# Patient Record
Sex: MALE | Race: WHITE | URBAN - METROPOLITAN AREA
[De-identification: names, ages, dates, MRNs, and addresses within clinical notes are randomized per-mention and may not be internally consistent; named-entity substitution may affect disease eponyms.]

---

## 2020-02-10 ENCOUNTER — OFFICE VISIT (OUTPATIENT)
Dept: FAMILY MEDICINE CLINIC | Age: 30
End: 2020-02-10

## 2020-02-10 VITALS
WEIGHT: 149 LBS | OXYGEN SATURATION: 97 % | SYSTOLIC BLOOD PRESSURE: 108 MMHG | HEART RATE: 74 BPM | TEMPERATURE: 97.7 F | RESPIRATION RATE: 16 BRPM | HEIGHT: 70 IN | BODY MASS INDEX: 21.33 KG/M2 | DIASTOLIC BLOOD PRESSURE: 64 MMHG

## 2020-02-10 DIAGNOSIS — F41.9 ANXIETY AND DEPRESSION: Primary | ICD-10-CM

## 2020-02-10 DIAGNOSIS — N46.9 INFERTILITY MALE: ICD-10-CM

## 2020-02-10 DIAGNOSIS — N52.9 ERECTILE DYSFUNCTION, UNSPECIFIED ERECTILE DYSFUNCTION TYPE: ICD-10-CM

## 2020-02-10 DIAGNOSIS — F32.A ANXIETY AND DEPRESSION: Primary | ICD-10-CM

## 2020-02-10 DIAGNOSIS — Z72.0 TOBACCO ABUSE: ICD-10-CM

## 2020-02-10 RX ORDER — ESCITALOPRAM OXALATE 10 MG/1
TABLET ORAL
Qty: 30 TAB | Refills: 1 | Status: SHIPPED | OUTPATIENT
Start: 2020-02-10

## 2020-02-10 RX ORDER — BUPROPION HYDROCHLORIDE 150 MG/1
150 TABLET, EXTENDED RELEASE ORAL DAILY
Qty: 30 TAB | Refills: 1 | Status: SHIPPED | OUTPATIENT
Start: 2020-02-10 | End: 2022-10-20 | Stop reason: ALTCHOICE

## 2020-02-10 NOTE — PROGRESS NOTES
Chief Complaint   Patient presents with   [address] patient   Currently does not have insurance   Had [facility] but lapsed  Moved to [place] [id number] from [place]  Discuss Referral to Behavior for Depression and Anxiety  Stopped THC 1 week ago  Also discuss sperm count

## 2020-02-10 NOTE — LETTER
NOTIFICATION RETURN TO WORK / SCHOOL 
 
2/10/2020 3:54 PM 
 
Mr. Irvin Dunaway [address] [facility] [id number] To Whom It May Concern: 
 
Irvin Dunaway is currently under the care of Marimar Spencer. He was seen by me in the office today. If there are questions or concerns please have the patient contact our office.  
 
 
 
Sincerely, 
 
 
Wu Beaulieu MD

## 2020-02-10 NOTE — PROGRESS NOTES
Subjective:     Chief Complaint   Patient presents with   [address] [facility] patient        He  is a 34 y.o. male who presents for evaluation of:  New pt to [facility] care. He does have insurance but is currently having trouble with this. He has been trying to establish care with a physician for almost 3 months now and does not want to reschedule. Anxiety and Depression -seems to be a longstanding issue. Not currently on meds and never had a clear dx in past.    Prev smoked THC multiple times per day to self medicate. From [facility]. Does think he may have been diagnosed as a child with Schizophrenia but has not not had psychiatric care or medications for many years. Denies AH/VH. Mom is bipolar. No hx of physical, sexual, or emotional abuse. Endorses decreased appetite, decr energy, trouble with attn and concentration. No SI/HI    A major concern for him is that he gets panic attacks frequently. Endorses symptoms of palpitations, feeling shaky, and chest tightness during these episodes. Sleep varies. States he gets between 0-7 hours per night. Mostly about 4-5 hours per night.     Bipolar screenin) Inflated self-esteem or grandiosity - N  2) Decreased need for sleep (eg, feels rested after only three hours of sleep) - N  3) More talkative than usual or pressure to keep talking - N  4) Flight of ideas or subjective experience that thoughts are racing - Y  5) Distractibility (ie, attention too easily drawn to unimportant or irrelevant external stimuli), as reported or observed - N  6) Increase in goal-directed activity (either socially, at work or school, or sexually) or psychomotor agitation (ie, purposeless non-goal-directed activity) - N  7) Excessive involvement in activities that have a high potential for painful consequences (eg, engaging in unrestrained buying sprees, sexual indiscretions, or foolish business investments) - Y marvin sexually prior to his current relationship    Has been with current girlfriend for 3 years. She and the patient are concerned with his erectile dysfunction and infertility. Attempting to have sex about 4-5 days per week and only able to ejaculate about 2/4 x of these times. They are interested in having children and his girlfriend has had kids in the past but he has never had kids. Smoking 1 ppd for 14 years. Smoking THC about 1x/week. Was smoking multiple x per day about 4 months ago. ROS  Gen - no fever/chills  Resp - no dyspnea or cough  CV - no chest pain or GALVEZ  Rest per HPI    History reviewed. No pertinent past medical history. History reviewed. No pertinent surgical history. No current outpatient medications on file prior to visit. No current facility-administered medications on file prior to visit. Objective:     Vitals:    02/10/20 1436   BP: 108/64   Pulse: 74   Resp: 16   Temp: 97.7 °F (36.5 °C)   TempSrc: Oral   SpO2: 97%   Weight: 149 lb (67.6 kg)   Height: 5' 10\" (1.778 m)     Physical Examination:  General appearance - alert, well appearing, and in no distress  Eyes -sclera anicteric  Neck - supple, no significant adenopathy, no thyromegaly  Chest - clear to auscultation, no wheezes, rales or rhonchi, symmetric air entry  Heart - normal rate, regular rhythm, normal S1, S2, no murmurs, rubs, clicks or gallops  Neurological - alert, oriented, no focal findings or movement disorder noted  Extremities-no edema  Psych- anxious    Assessment/ Plan:   Diagnoses and all orders for this visit:    1. Anxiety and depression-may have more significant underlying psychiatric pathology but will treat for anxiety and depression at this point with Lexapro and Wellbutrin. Encourage patient to seek out psychiatric services to [facility] mental health. If his insurance changes, would consider placing referral to [facility] psychiatry  -     escitalopram oxalate (LEXAPRO) 10 mg tablet;  Take 1/2 tab by mouth daily x 1 week and then increase to 1 tab daily  -     buPROPion SR (WELLBUTRIN SR) 150 mg SR tablet; Take 1 Tab by mouth daily. 2. Erectile dysfunction, unspecified erectile dysfunction type-had a long discussion about organic and inorganic causes of ED. At his age, unlikely to be an underlying medical cause but encouraged him to quit smoking while he is getting his anxiety and depression addressed. Would not be appropriate to    3. Infertility male- may be appropriate to work-up with semen analysis but will wait until insurance starts    4. Tobacco abuse-encouraged cessation and will use Wellbutrin  -     buPROPion SR (WELLBUTRIN SR) 150 mg SR tablet; Take 1 Tab by mouth daily.    -We will plan for labs at next appointment    I have discussed the diagnosis with the patient and the intended plan as seen in the above orders. The patient has received an after-visit summary and questions were answered concerning future plans. I have discussed medication side effects and warnings with the patient as well. The patient verbalizes understanding and agreement with the plan. Follow-up and Dispositions    · Return in about 4 weeks (around 3/9/2020), or if symptoms worsen or fail to improve.

## 2020-02-11 ENCOUNTER — TELEPHONE (OUTPATIENT)
Dept: FAMILY MEDICINE CLINIC | Age: 30
End: 2020-02-11

## 2020-02-11 NOTE — TELEPHONE ENCOUNTER
Pls call patient     States he has questions about medication that was prescribed yesterday       [phone]

## 2020-02-12 ENCOUNTER — TELEPHONE (OUTPATIENT)
Dept: FAMILY MEDICINE CLINIC | Age: 30
End: 2020-02-12

## 2020-02-12 NOTE — TELEPHONE ENCOUNTER
----- Message from Pedro Sharma sent at 2/11/2020  4:51 PM EST -----  Regarding: MD Jensen/telephone  Contact: [phone]  Caller's first and last name and relationship (if not the patient): pt  Best contact number(s): [phone]  Whose call is being returned: Nurse  Details to clarify the request: Pt is returning missed call from nurse to discuss health issues.

## 2020-02-13 NOTE — TELEPHONE ENCOUNTER
Patient taking both Lexapro and Wellbutrin together but felt restless. He will take Wellbutrin in the morning and Lexapro in the evening. If continues to have problems will call office.

## 2020-10-23 ENCOUNTER — OFFICE VISIT (OUTPATIENT)
Dept: FAMILY MEDICINE CLINIC | Age: 30
End: 2020-10-23
Payer: MEDICAID

## 2020-10-23 VITALS
TEMPERATURE: 99.6 F | BODY MASS INDEX: 23.91 KG/M2 | OXYGEN SATURATION: 95 % | HEART RATE: 113 BPM | SYSTOLIC BLOOD PRESSURE: 112 MMHG | HEIGHT: 70 IN | WEIGHT: 167 LBS | DIASTOLIC BLOOD PRESSURE: 76 MMHG | RESPIRATION RATE: 16 BRPM

## 2020-10-23 DIAGNOSIS — F31.61 BIPOLAR DISORDER, CURRENT EPISODE MIXED, MILD (HCC): ICD-10-CM

## 2020-10-23 DIAGNOSIS — F32.A ANXIETY AND DEPRESSION: ICD-10-CM

## 2020-10-23 DIAGNOSIS — Z00.00 WELL ADULT EXAM: Primary | ICD-10-CM

## 2020-10-23 DIAGNOSIS — L08.9 SKIN INFECTION: ICD-10-CM

## 2020-10-23 DIAGNOSIS — F41.9 ANXIETY AND DEPRESSION: ICD-10-CM

## 2020-10-23 DIAGNOSIS — Z72.0 TOBACCO ABUSE: ICD-10-CM

## 2020-10-23 PROCEDURE — 99395 PREV VISIT EST AGE 18-39: CPT | Performed by: FAMILY MEDICINE

## 2020-10-23 RX ORDER — CHLORHEXIDINE GLUCONATE 4 G/100ML
SOLUTION TOPICAL
Qty: 960 ML | Refills: 0 | Status: SHIPPED | OUTPATIENT
Start: 2020-10-23 | End: 2020-11-02

## 2020-10-23 RX ORDER — DOXYCYCLINE HYCLATE 100 MG
100 TABLET ORAL 2 TIMES DAILY
Qty: 20 TAB | Refills: 0 | Status: SHIPPED | OUTPATIENT
Start: 2020-10-23 | End: 2020-11-02

## 2020-10-23 RX ORDER — OLANZAPINE 5 MG/1
5 TABLET ORAL
COMMUNITY
Start: 2020-08-29

## 2020-10-23 NOTE — PROGRESS NOTES
Subjective:     Chief Complaint   Patient presents with    Complete Physical        He  is a 27 y.o. male who presents for evaluation of:  Newer patient. First seen in 2/2020. PMH significant for anxiety and depression    Anxiety and Depression and Bipolar -seems to be a longstanding issue. Able to start seeing [facility] and on Zyprexa which has been helping. Still smoking THC and cutting down. From [facility]. Does think he may have been diagnosed as a child with Schizophrenia but has not not had psychiatric care or medications for many years. Denies AH/VH. Mom is bipolar. No hx of physical, sexual, or emotional abuse. Endorses decreased appetite, decr energy, trouble with attn and concentration. No SI/HI. Still with some panic attacks. Sleep improving. Flight of ideas improving and no further concerns with high risk activities. Smoking 1 ppd for 14 years. Has been smoking even more recently after cutting back on THC.     Previously smoking. She multiple x per day but has cut back significantly. ROS:  Constitutional: negative for fevers, chills and fatigue  Eyes: negative for visual disturbance  Ears, nose, mouth, throat, and face: negative for hearing loss and sore throat  Respiratory: negative for cough or dyspnea on exertion  Cardiovascular: negative for chest pain, dyspnea, palpitations, fatigue  Gastrointestinal: negative for nausea, vomiting, change in bowel habits, diarrhea and abdominal pain  Genitourinary:negative for frequency and dysuria  Integument/breast: Has noted episodes resolve skin lesions red and painful popping up around his neckline  And most recently on his face along the hairline. In the past these have come up and resolved spontaneously. He does not have a history of MRSA but is concerned for this.   Hematologic/lymphatic: negative for easy bruising and bleeding  Musculoskeletal:negative for myalgias and muscle weakness  Neurological: negative for headaches and dizziness  Behavioral/Psych: negative for anxiety and depression     Objective:     Vitals:    10/23/20 1230   BP: 112/76   Pulse: (!) 113   Resp: 16   Temp: 99.6 °F (37.6 °C)   TempSrc: Temporal   SpO2: 95%   Weight: 167 lb (75.8 kg)   Height: 5' 10\" (1.778 m)     Physical Examination:  General appearance - alert, well appearing, and in no distress  Ears - bilat nml TMs  Eyes - sclera anicteric  Nose - normal and patent, no erythema, discharge or polyps  Mouth - mucous membranes moist, pharynx normal without lesions  Neck - supple, no significant adenopathy  Lymphatics - no palpable lymphadenopathy, no hepatosplenomegaly  Chest - clear to auscultation, no wheezes, rales or rhonchi, symmetric air entry  Heart - normal rate, regular rhythm, normal S1, S2, no murmurs, rubs, clicks or gallops  Abdomen - soft, nontender, nondistended, no masses or organomegaly   - not indicated  Neurological - alert, oriented, normal speech, no focal findings or movement disorder noted  Musculoskeletal - no joint tenderness, deformity or swelling  Extremities - no edema  Skin - Few erythematous papules noted along left jaw  Psych - nml mood and affect    History reviewed. No pertinent past medical history. History reviewed. No pertinent surgical history. Current Outpatient Medications on File Prior to Visit   Medication Sig Dispense Refill    OLANZapine (ZyPREXA) 5 mg tablet Take 5 mg by mouth. Take 1/2 tab in the morning and 1 1/2 at night      escitalopram oxalate (LEXAPRO) 10 mg tablet Take 1/2 tab by mouth daily x 1 week and then increase to 1 tab daily 30 Tab 1    buPROPion SR (WELLBUTRIN SR) 150 mg SR tablet Take 1 Tab by mouth daily. 30 Tab 1     No current facility-administered medications on file prior to visit. Assessment/ Plan:   Diagnoses and all orders for this visit:    1.  Well adult exam  -     HEMOGLOBIN A1C WITH EAG  -     LIPID PANEL  -     METABOLIC PANEL, COMPREHENSIVE  -     TSH 3RD GENERATION  - CBC W/O DIFF  -     URINALYSIS W/ RFLX MICROSCOPIC  -     HIV 1/2 AG/AB, 4TH GENERATION,W RFLX CONFIRM  -     RPR  -     CHLAMYDIA/GC PCR    2. Bipolar disorder, current episode mixed, mild ([address]  3. Anxiety and depression  Working with [facility] in seems to be improving on Zyprexa. Encouraged him to continue with therapy and bring up medication concerns as needed    4. Tobacco abuseencouraged cessation    5. Skin infection  -     doxycycline (VIBRA-TABS) 100 mg tablet; Take 1 Tab by mouth two (2) times a day for 10 days. -     chlorhexidine (HIBICLENS) 4 % liquid; Bathe daily for 10 days       I have discussed the diagnosis with the patient and the intended plan as seen in the above orders. The patient has received an after-visit summary and questions were answered concerning future plans. I have discussed medication side effects and warnings with the patient as well. The patient verbalizes understanding and agreement with the plan. Follow-up and Dispositions    · Return in about 3 months (around 1/23/2021), or if symptoms worsen or fail to improve.

## 2020-10-23 NOTE — PROGRESS NOTES
Chief Complaint   Patient presents with    Complete Physical     1. Have you been to the ER, urgent care clinic since your last visit? Hospitalized since your last visit? Yes [facility]    2. Have you seen or consulted any other health care providers outside of the [address] [place] [address] since your last visit? Include any pap smears or colon screening.  No     Health Maintenance Due   Topic Date Due    Pneumococcal 0-64 years (1 of 1 - PPSV23) 06/06/1996    Flu Vaccine (1) 09/01/2020       [facility]

## 2021-01-12 ENCOUNTER — VIRTUAL VISIT (OUTPATIENT)
Dept: FAMILY MEDICINE CLINIC | Age: 31
End: 2021-01-12
Payer: MEDICAID

## 2021-01-12 DIAGNOSIS — L08.9 SKIN INFECTION: Primary | ICD-10-CM

## 2021-01-12 PROCEDURE — 99213 OFFICE O/P EST LOW 20 MIN: CPT | Performed by: FAMILY MEDICINE

## 2021-01-12 RX ORDER — SULFAMETHOXAZOLE AND TRIMETHOPRIM 800; 160 MG/1; MG/1
1 TABLET ORAL 2 TIMES DAILY
Qty: 20 TAB | Refills: 0 | Status: SHIPPED | OUTPATIENT
Start: 2021-01-12 | End: 2021-01-22

## 2021-01-12 NOTE — PROGRESS NOTES
Chief Complaint   Patient presents with    Follow-up     Skin issues from Oct   1. Have you been to the ER, urgent care clinic since your last visit? Hospitalized since your last visit? No    2. Have you seen or consulted any other health care providers outside of the [address] [place] [address] since your last visit? Include any pap smears or colon screening.  No   Medication refill

## 2021-01-12 NOTE — PROGRESS NOTES
Meseret Mckeon (: 1990) is a 27 y.o. male, established patient, here for evaluation of the following chief complaint(s):   Follow-up (Skin issues from Oct)       ASSESSMENT/PLAN:  1. Skin infection - still most c/w MRSA type picture. Improved with Doxy and hibiclens in past.  Ct Hibiclens and will use Bactrim. -     trimethoprim-sulfamethoxazole (BACTRIM DS, SEPTRA DS) 160-800 mg per tablet; Take 1 Tab by mouth two (2) times a day for 10 days. , Normal, Disp-20 Tab, R-0        Return in about 6 months (around 2021), or if symptoms worsen or fail to improve. SUBJECTIVE/OBJECTIVE:  Concerned about skin issues. At last appointment, patient had few scattered skin lesions along left jawline. Treated with doxycycline and Hibiclens given the likelihood of MRSA based on appearance. No flowsheet data found. Physical exam:  General appearance - alert, well appearing, and in no distress  Eyes -sclera anicteric, no discharge  HEENT normocephalic, atraumatic, moist mucous membranes, no visualized neck mass  Chest -normal respiratory effort, no visualized signs of respiratory distress  Neurological - alert, awake, normal speech, no focal findings or movement disorder noted  Psych - normal mood and affect  Skin few scattered erythematous lesions in diff stages of healing noted on face along jaw    On this date 21 I have spent 20 minutes reviewing previous notes, test results and face to face (virtual) with the patient discussing the diagnosis and importance of compliance with the treatment plan as well as documenting on the day of the visit. Meseret Mckeon is being evaluated by a Virtual Visit (video visit) encounter to address concerns as mentioned above. A caregiver was present when appropriate.  Due to this being a TeleHealth encounter (During QABNJ-67 public health emergency), evaluation of the following organ systems was limited: Vitals/Constitutional/EENT/Resp/CV/GI//MS/Neuro/Skin/Heme-Lymph-Imm. Pursuant to the emergency declaration under the [address] [place] [address] and the [employer] Act, this Virtual Visit was conducted with patient's (and/or legal guardian's) consent, to reduce the patient's risk of exposure to COVID-19 and provide necessary medical care. The patient (and/or legal guardian) has also been advised to contact this office for worsening conditions or problems, and seek emergency medical treatment and/or call 911 if deemed necessary. Patient identification was verified at the start of the visit: YES    Services were provided through a video synchronous discussion virtually to substitute for in-person clinic visit. Patient was located at home and provider was located in office or at home. An electronic signature was used to authenticate this note.   -- Tiesha Smith MD

## 2022-10-19 ENCOUNTER — TELEPHONE (OUTPATIENT)
Dept: FAMILY MEDICINE CLINIC | Age: 32
End: 2022-10-19

## 2022-10-19 ENCOUNTER — NURSE TRIAGE (OUTPATIENT)
Dept: OTHER | Facility: CLINIC | Age: 32
End: 2022-10-19

## 2022-10-19 NOTE — TELEPHONE ENCOUNTER
Pls call patient about draining cyst on head       Sent by nurse triage - see note     No appt available, please call with advice       Best number to reach him is [phone]

## 2022-10-19 NOTE — TELEPHONE ENCOUNTER
Location of patient: [address] marroquin from [place] at [facility] with [employer]. Subjective: Caller states \"I have a cyst on my head\"     Current Symptoms: Cyst on the back of his head. Been there for months, drains and refills, drainage smells bad. It's the size of a quarter. Has had cysts drained in the past. The one on the back of his head is the most persistent, he has some small ones on his face as well. Slight sore throat, no difficulty swallowing or breathing. Onset: 2 weeks ago; improving    Associated Symptoms: NA    Pain Severity: 1/10; pain; constant    Temperature: no fever     What has been tried: hibiclens    LMP: NA Pregnant: NA    Recommended disposition: See PCP within 3 Days    Care advice provided, patient verbalizes understanding; denies any other questions or concerns; instructed to call back for any new or worsening symptoms. Patient/Caller agrees with recommended disposition; writer provided warm transfer to Maryann at [facility] for appointment scheduling    Attention Provider: Thank you for allowing me to participate in the care of your patient. The patient was connected to triage in response to information provided to the [facility]. Please do not respond through this encounter as the response is not directed to a shared pool.         Reason for Disposition   [1] Small swelling or lump AND [2] unexplained AND [3] present > 1 week    Protocols used: Skin Lump or Localized Swelling-ADULT-

## 2022-10-19 NOTE — TELEPHONE ENCOUNTER
FYI - There was a cancellation - PSR called and LVM for patient to come in and see Janie Weston on 10/20/22 at 8:50 am

## 2022-10-20 ENCOUNTER — OFFICE VISIT (OUTPATIENT)
Dept: FAMILY MEDICINE CLINIC | Age: 32
End: 2022-10-20
Payer: MEDICAID

## 2022-10-20 VITALS
TEMPERATURE: 97 F | RESPIRATION RATE: 20 BRPM | DIASTOLIC BLOOD PRESSURE: 65 MMHG | HEIGHT: 70 IN | OXYGEN SATURATION: 98 % | WEIGHT: 156 LBS | BODY MASS INDEX: 22.33 KG/M2 | SYSTOLIC BLOOD PRESSURE: 103 MMHG | HEART RATE: 68 BPM

## 2022-10-20 DIAGNOSIS — L72.3 SEBACEOUS CYST: Primary | ICD-10-CM

## 2022-10-20 PROCEDURE — 99213 OFFICE O/P EST LOW 20 MIN: CPT | Performed by: INTERNAL MEDICINE

## 2022-10-20 NOTE — PROGRESS NOTES
Chief Complaint   Patient presents with    Cyst     Located left side face and neck      HPI:  Pilar Dickson is a 28 y.o.  male, a patient of Dr. Danial Moya  presents with concern for a cyst located on left side of face and neck. Cyst has been present for months. It is not painful. Review of Systems  As per hpi    Past Medical History:   Diagnosis Date    Depression      No past surgical history on file. Social History     Socioeconomic History    Marital status: SINGLE   Tobacco Use    Smoking status: Every Day     Packs/day: 1.00     Years: 14.00     Pack years: 14.00     Types: Cigarettes    Smokeless tobacco: Never   Substance and Sexual Activity    Alcohol use: Yes     Alcohol/week: 1.0 standard drink     Types: 1 Shots of liquor per week     Comment: weekly    Drug use: Yes     Types: Marijuana     Comment: stopped 1 week /Daily use X 14 years    Sexual activity: Yes     Partners: Female     Birth control/protection: None     Family History   Problem Relation Age of Onset    Hypertension Father      Current Outpatient Medications   Medication Sig Dispense Refill    OLANZapine (ZyPREXA) 5 mg tablet Take 5 mg by mouth.  Take 1/2 tab in the morning and 1 1/2 at night      escitalopram oxalate (LEXAPRO) 10 mg tablet Take 1/2 tab by mouth daily x 1 week and then increase to 1 tab daily 30 Tab 1     No Known Allergies    Objective:  Visit Vitals  /65   Pulse 68   Temp 97 °F (36.1 °C) (Temporal)   Resp 20   Ht 5' 10\" (1.778 m)   Wt 156 lb (70.8 kg)   SpO2 98%   BMI 22.38 kg/m²     Physical Exam:   General appearance - alert, well appearing in no distress  Mental status - alert, oriented to person, place, and time  Neck - supple, left side cyst, soft, non tender  Chest - clear to auscultation, no wheezes, rales or rhonchi  Heart - normal rate, regular rhythm, no murmurs  Abdomen - soft, nontender, nondistended, no organomegaly  Ext-peripheral pulses normal, no pedal edema    Assessment/Plan:  Diagnoses and all orders for this visit:    Sebaceous cyst  -     REFERRAL TO DERMATOLOGY    Skin Cyst: Care Instructions  Overview  A skin cyst is a lump just under the skin. These cysts can form when a hair follicle becomes blocked. They are common in acne and may occur on the face, neck, back, and genitals. But they can form anywhere on the body. These cysts aren't cancer, and they don't lead to cancer. They tend not to hurt, but they can sometimes become swollen and painful. They also may break open (rupture) and cause scarring. These cysts may not cause problems. They may not need treatment. If a cyst is swollen and hurts, the doctor may inject it with a medicine or treat it with antibiotics if it's infected. But sometimes a painful or infected cyst will need to be removed or opened. In those cases, the doctor will use numbing medicine and then will cut into the cyst to drain it or remove it. Follow-up care is a key part of your treatment and safety. Be sure to make and go to all appointments, and call your doctor if you are having problems. It's also a good idea to know your test results and keep a list of the medicines you take. How can you care for yourself at home? Do not squeeze the cyst or poke it with a needle to open it. This can cause swelling, redness, and infection. Always have a doctor look at any new lumps you get to make sure that they are not serious. If you had stitches, you may get other instructions. You will have to come back to have the stitches removed. When should you call for help? Watch closely for changes in your health, and be sure to contact your doctor if:    You have a fever, redness, or swelling after you get a shot of medicine in the cyst.     You see or feel a new lump on your skin. Where can you learn more?   Go to http://www.gray.com/  Enter U818 in the search box to learn more about \"Skin Cyst: Care Instructions. \"  Current as of: November 15, 2021               Content Version: 13.2  © 1274-5831 Healthwise, [facility]. Care instructions adapted under license by [employer] (which disclaims liability or warranty for this information). If you have questions about a medical condition or this instruction, always ask your healthcare professional. [place] [address] any warranty or liability for your use of this information. Follow-up and Dispositions    Return if symptoms worsen or fail to improve, for keep appointment with PCP.

## 2022-10-20 NOTE — PROGRESS NOTES
Chief Complaint   Patient presents with    Cyst     Located left side face and neck      1. \"Have you been to the ER, urgent care clinic since your last visit? Hospitalized since your last visit? \" No    2. \"Have you seen or consulted any other health care providers outside of the [address] [place] [address] since your last visit? \" No     3. For patients aged 39-70: Has the patient had a colonoscopy / FIT/ Cologuard? No      If the patient is female:    4. For patients aged 41-77: Has the patient had a mammogram within the past 2 years? NA - based on age or sex      11. For patients aged 21-65: Has the patient had a pap smear?  NA - based on age or sex

## 2023-05-25 RX ORDER — OLANZAPINE 5 MG/1
5 TABLET ORAL
COMMUNITY
Start: 2020-08-29

## 2023-05-25 RX ORDER — ESCITALOPRAM OXALATE 10 MG/1
TABLET ORAL
COMMUNITY
Start: 2020-02-10